# Patient Record
Sex: MALE | Race: BLACK OR AFRICAN AMERICAN
[De-identification: names, ages, dates, MRNs, and addresses within clinical notes are randomized per-mention and may not be internally consistent; named-entity substitution may affect disease eponyms.]

---

## 2018-04-17 ENCOUNTER — HOSPITAL ENCOUNTER (INPATIENT)
Dept: HOSPITAL 17 - NEPE | Age: 73
LOS: 6 days | Discharge: NURSING, REHAB OR LONG-TERM CARE | DRG: 482 | End: 2018-04-23
Attending: HOSPITALIST | Admitting: HOSPITALIST
Payer: MEDICARE

## 2018-04-17 VITALS
DIASTOLIC BLOOD PRESSURE: 58 MMHG | TEMPERATURE: 98.7 F | RESPIRATION RATE: 18 BRPM | HEART RATE: 90 BPM | SYSTOLIC BLOOD PRESSURE: 111 MMHG | OXYGEN SATURATION: 92 %

## 2018-04-17 VITALS
SYSTOLIC BLOOD PRESSURE: 100 MMHG | HEART RATE: 95 BPM | RESPIRATION RATE: 16 BRPM | TEMPERATURE: 99.3 F | OXYGEN SATURATION: 100 % | DIASTOLIC BLOOD PRESSURE: 67 MMHG

## 2018-04-17 VITALS
SYSTOLIC BLOOD PRESSURE: 116 MMHG | HEART RATE: 96 BPM | TEMPERATURE: 99.3 F | RESPIRATION RATE: 18 BRPM | OXYGEN SATURATION: 96 % | DIASTOLIC BLOOD PRESSURE: 71 MMHG

## 2018-04-17 VITALS
RESPIRATION RATE: 16 BRPM | TEMPERATURE: 99.5 F | OXYGEN SATURATION: 99 % | DIASTOLIC BLOOD PRESSURE: 63 MMHG | SYSTOLIC BLOOD PRESSURE: 109 MMHG | HEART RATE: 83 BPM

## 2018-04-17 VITALS
DIASTOLIC BLOOD PRESSURE: 68 MMHG | HEART RATE: 88 BPM | SYSTOLIC BLOOD PRESSURE: 112 MMHG | RESPIRATION RATE: 18 BRPM | OXYGEN SATURATION: 97 %

## 2018-04-17 VITALS
RESPIRATION RATE: 16 BRPM | DIASTOLIC BLOOD PRESSURE: 84 MMHG | HEART RATE: 80 BPM | OXYGEN SATURATION: 98 % | TEMPERATURE: 98 F | SYSTOLIC BLOOD PRESSURE: 145 MMHG

## 2018-04-17 VITALS
HEART RATE: 83 BPM | SYSTOLIC BLOOD PRESSURE: 127 MMHG | DIASTOLIC BLOOD PRESSURE: 83 MMHG | TEMPERATURE: 98.7 F | RESPIRATION RATE: 18 BRPM | OXYGEN SATURATION: 100 %

## 2018-04-17 VITALS — HEIGHT: 68 IN | WEIGHT: 120.15 LBS | BODY MASS INDEX: 18.21 KG/M2

## 2018-04-17 VITALS — OXYGEN SATURATION: 99 %

## 2018-04-17 DIAGNOSIS — F17.210: ICD-10-CM

## 2018-04-17 DIAGNOSIS — K59.03: ICD-10-CM

## 2018-04-17 DIAGNOSIS — N40.0: ICD-10-CM

## 2018-04-17 DIAGNOSIS — S72.142A: Primary | ICD-10-CM
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
## 2018-04-17 DIAGNOSIS — D64.89: ICD-10-CM

## 2018-04-17 DIAGNOSIS — F14.10: ICD-10-CM

## 2018-04-17 DIAGNOSIS — T40.605A: ICD-10-CM

## 2018-04-17 DIAGNOSIS — F10.10: ICD-10-CM

## 2018-04-17 LAB
ALBUMIN SERPL-MCNC: 3.8 GM/DL (ref 3.4–5)
ALP SERPL-CCNC: 73 U/L (ref 45–117)
ALT SERPL-CCNC: 27 U/L (ref 12–78)
AST SERPL-CCNC: 42 U/L (ref 15–37)
BASOPHILS # BLD AUTO: 0 TH/MM3 (ref 0–0.2)
BASOPHILS NFR BLD: 0.8 % (ref 0–2)
BILIRUB SERPL-MCNC: 1.1 MG/DL (ref 0.2–1)
BUN SERPL-MCNC: 11 MG/DL (ref 7–18)
CALCIUM SERPL-MCNC: 8.8 MG/DL (ref 8.5–10.1)
CHLORIDE SERPL-SCNC: 105 MEQ/L (ref 98–107)
COLOR UR: YELLOW
CREAT SERPL-MCNC: 1.21 MG/DL (ref 0.6–1.3)
EOSINOPHIL # BLD: 0.1 TH/MM3 (ref 0–0.4)
EOSINOPHIL NFR BLD: 2.7 % (ref 0–4)
ERYTHROCYTE [DISTWIDTH] IN BLOOD BY AUTOMATED COUNT: 14.7 % (ref 11.6–17.2)
GFR SERPLBLD BASED ON 1.73 SQ M-ARVRAT: 72 ML/MIN (ref 89–?)
GLUCOSE SERPL-MCNC: 89 MG/DL (ref 74–106)
GLUCOSE UR STRIP-MCNC: (no result) MG/DL
HCO3 BLD-SCNC: 26 MEQ/L (ref 21–32)
HCT VFR BLD CALC: 39.8 % (ref 39–51)
HGB BLD-MCNC: 13.8 GM/DL (ref 13–17)
HGB UR QL STRIP: (no result)
INR PPP: 1 RATIO
KETONES UR STRIP-MCNC: (no result) MG/DL
LYMPHOCYTES # BLD AUTO: 1.2 TH/MM3 (ref 1–4.8)
LYMPHOCYTES NFR BLD AUTO: 33.6 % (ref 9–44)
MCH RBC QN AUTO: 33.3 PG (ref 27–34)
MCHC RBC AUTO-ENTMCNC: 34.7 % (ref 32–36)
MCV RBC AUTO: 96 FL (ref 80–100)
MONOCYTE #: 0.2 TH/MM3 (ref 0–0.9)
MONOCYTES NFR BLD: 6.7 % (ref 0–8)
MUCOUS THREADS #/AREA URNS LPF: (no result) /LPF
NEUTROPHILS # BLD AUTO: 1.9 TH/MM3 (ref 1.8–7.7)
NEUTROPHILS NFR BLD AUTO: 56.2 % (ref 16–70)
NITRITE UR QL STRIP: (no result)
PLATELET # BLD: 252 TH/MM3 (ref 150–450)
PMV BLD AUTO: 8 FL (ref 7–11)
PROT SERPL-MCNC: 7.5 GM/DL (ref 6.4–8.2)
PROTHROMBIN TIME: 10.3 SEC (ref 9.8–11.6)
RBC # BLD AUTO: 4.15 MIL/MM3 (ref 4.5–5.9)
SODIUM SERPL-SCNC: 138 MEQ/L (ref 136–145)
SP GR UR STRIP: 1.01 (ref 1–1.03)
URINE LEUKOCYTE ESTERASE: (no result)
WBC # BLD AUTO: 3.4 TH/MM3 (ref 4–11)

## 2018-04-17 PROCEDURE — 86850 RBC ANTIBODY SCREEN: CPT

## 2018-04-17 PROCEDURE — 82306 VITAMIN D 25 HYDROXY: CPT

## 2018-04-17 PROCEDURE — 76000 FLUOROSCOPY <1 HR PHYS/QHP: CPT

## 2018-04-17 PROCEDURE — 85730 THROMBOPLASTIN TIME PARTIAL: CPT

## 2018-04-17 PROCEDURE — C1713 ANCHOR/SCREW BN/BN,TIS/BN: HCPCS

## 2018-04-17 PROCEDURE — 73552 X-RAY EXAM OF FEMUR 2/>: CPT

## 2018-04-17 PROCEDURE — 85018 HEMOGLOBIN: CPT

## 2018-04-17 PROCEDURE — 80053 COMPREHEN METABOLIC PANEL: CPT

## 2018-04-17 PROCEDURE — 84100 ASSAY OF PHOSPHORUS: CPT

## 2018-04-17 PROCEDURE — 86920 COMPATIBILITY TEST SPIN: CPT

## 2018-04-17 PROCEDURE — 85014 HEMATOCRIT: CPT

## 2018-04-17 PROCEDURE — 84443 ASSAY THYROID STIM HORMONE: CPT

## 2018-04-17 PROCEDURE — 82272 OCCULT BLD FECES 1-3 TESTS: CPT

## 2018-04-17 PROCEDURE — 81001 URINALYSIS AUTO W/SCOPE: CPT

## 2018-04-17 PROCEDURE — 80048 BASIC METABOLIC PNL TOTAL CA: CPT

## 2018-04-17 PROCEDURE — P9016 RBC LEUKOCYTES REDUCED: HCPCS

## 2018-04-17 PROCEDURE — 84439 ASSAY OF FREE THYROXINE: CPT

## 2018-04-17 PROCEDURE — 71045 X-RAY EXAM CHEST 1 VIEW: CPT

## 2018-04-17 PROCEDURE — 73502 X-RAY EXAM HIP UNI 2-3 VIEWS: CPT

## 2018-04-17 PROCEDURE — 85610 PROTHROMBIN TIME: CPT

## 2018-04-17 PROCEDURE — 83036 HEMOGLOBIN GLYCOSYLATED A1C: CPT

## 2018-04-17 PROCEDURE — 83735 ASSAY OF MAGNESIUM: CPT

## 2018-04-17 PROCEDURE — 36430 TRANSFUSION BLD/BLD COMPNT: CPT

## 2018-04-17 PROCEDURE — 96372 THER/PROPH/DIAG INJ SC/IM: CPT

## 2018-04-17 PROCEDURE — 0QS706Z REPOSITION LEFT UPPER FEMUR WITH INTRAMEDULLARY INTERNAL FIXATION DEVICE, OPEN APPROACH: ICD-10-PCS | Performed by: ORTHOPAEDIC SURGERY

## 2018-04-17 PROCEDURE — 85025 COMPLETE CBC W/AUTO DIFF WBC: CPT

## 2018-04-17 PROCEDURE — 96374 THER/PROPH/DIAG INJ IV PUSH: CPT

## 2018-04-17 PROCEDURE — 86900 BLOOD TYPING SEROLOGIC ABO: CPT

## 2018-04-17 PROCEDURE — 86901 BLOOD TYPING SEROLOGIC RH(D): CPT

## 2018-04-17 PROCEDURE — 93005 ELECTROCARDIOGRAM TRACING: CPT

## 2018-04-17 RX ADMIN — MORPHINE SULFATE PRN MG: 2 INJECTION, SOLUTION INTRAMUSCULAR; INTRAVENOUS at 10:57

## 2018-04-17 RX ADMIN — PHENYTOIN SODIUM SCH MLS/HR: 50 INJECTION INTRAMUSCULAR; INTRAVENOUS at 02:00

## 2018-04-17 RX ADMIN — STANDARDIZED SENNA CONCENTRATE AND DOCUSATE SODIUM SCH TAB: 8.6; 5 TABLET, FILM COATED ORAL at 20:21

## 2018-04-17 RX ADMIN — CALCIUM CARBONATE-CHOLECALCIFEROL TAB 250 MG-125 UNIT SCH MG: 250-125 TAB at 18:34

## 2018-04-17 RX ADMIN — MORPHINE SULFATE PRN MG: 2 INJECTION, SOLUTION INTRAMUSCULAR; INTRAVENOUS at 18:34

## 2018-04-17 RX ADMIN — STANDARDIZED SENNA CONCENTRATE AND DOCUSATE SODIUM SCH TAB: 8.6; 5 TABLET, FILM COATED ORAL at 07:09

## 2018-04-17 RX ADMIN — Medication SCH ML: at 20:21

## 2018-04-17 RX ADMIN — PHENYTOIN SODIUM SCH MLS/HR: 50 INJECTION INTRAMUSCULAR; INTRAVENOUS at 16:18

## 2018-04-17 RX ADMIN — MORPHINE SULFATE PRN MG: 2 INJECTION, SOLUTION INTRAMUSCULAR; INTRAVENOUS at 05:41

## 2018-04-17 RX ADMIN — FOLIC ACID SCH MG: 1 TABLET ORAL at 07:09

## 2018-04-17 RX ADMIN — Medication SCH ML: at 07:09

## 2018-04-17 RX ADMIN — Medication SCH MG: at 07:09

## 2018-04-17 RX ADMIN — MULTIPLE VITAMINS W/ MINERALS TAB SCH TAB: TAB at 07:09
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
## 2018-04-17 NOTE — PD.OP
cc:   Get Abad MD


__________________________________________________





Operative Report


Date of Surgery:  Apr 17, 2018


Preoperative Diagnosis:  


Displaced left hip intertrochanteric fracture


Postoperative Diagnosis:  


Procedure:


Left hip reduction and intramedullary fixation


Anesthesia:


General


Surgeon:


Get Abad


Assistant(s):


RIVAS Noe PA-C





 


The surgical procedure was assisted by my physician assistant. My P.A. presence 

was necessary throughout this case for the manipulation and positioning of the 

surgical extremity. My P.A. was assisting me throughout the duration of this 

procedure. The skill set of a physician assistant was medically necessary to 

complete this procedure. During the surgical case the surgical tech was working 

at the back table and the physician assistant was directly assisting me.


Operation and Findings:


Implants used: [12]mm x [420]mm   Synthes  troch nail





Plan of activity: Weight-bear as tolerated





Patient was seen and evaluated preoperatively.  The patient has significant hip 

pain from proximal femur fracture.  The risk and benefits of surgery were 

discussed in depth with the patient to include bleeding, infection, nonunion, 

malunion,  need for hip replacement, painful hardware, as well as medical 

competitions including blood clots, stroke, heart attack, and death.  Informed 

consent was obtained.  Operative site was marked.  Patient was brought to the 

operating room and placed on fracture table.  IV sedation was administered by 

anesthesiologist.  Timeout procedure was performed.  Hip and leg were prepped 

with alcohol followed by DuraPrep and draped in the usual sterile fashion.  IV 

antibiotics were given prior to incision.





Procedure began with reduction of fracture.  Traction was applied.  The leg was 

manipulated to achieve reduction.  Excellent reduction was achieved.  

Fluoroscopy was used to confirm reduction.  A three inch incision was made 

proximal to the trochanter.  Subcutaneous tissue was dissected bluntly.  

Guidepin was placed at the tip of the trochanter and advanced into the femoral 

canal.  Fluoroscopy confirmed appropriate guidepin placement.  A opening reamer 

was placed over the guidepin.  A long ball tipped guide pin was now placed down 

the femoral canal into the center of the distal femur.  The nail length was now 

measured.  Fluoroscopy confirmed appropriate guidepin placement.  Flexible 

reamers were now passed over the guidepin to ream the intramedullary canal.





The  nail was attached to the insertion handle.  Nail was now placed over the 

guidepin into the femoral canal.  Fluoroscopy confirmed appropriate nail 

placement.  A second incision was made over the lateral thigh.  Cannulas were 

placed through the insertion handle down to the femur.  Guidepin was now placed 

through the femoral nail into the center of the femoral head.  Fluoroscopy 

confirmed appropriate guidepin placement.  Screw length was measured.  

Cannulated drill was placed over the guidepin.  Appropriate length lag screw 

was now placed.  Traction was released and compression was applied. The set 

screw was now tightened in dynamic mode.





Next, using perfect [place] technique two distal interlocking screws were 

placed.  Screw holes were predrilled and screw lengths were measured.  Final 

fluoroscopy revealed well aligned fracture with well-placed hardware.  Incision 

was closed with 3-0 Vicryl and staples.  Sterile dressings were applied.  

Patient was awakened and transferred to recovery room.











Get Abad MD Apr 17, 2018 13:49
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
## 2018-04-17 NOTE — EKG
Date Performed: 04/17/2018       Time Performed: 01:16:48

 

PTAGE:      73 years

 

EKG:      Sinus rhythm 

 

 POSSIBLE RIGHT ATRIAL ENLARGEMENT LEFT ATRIAL ENLARGEMENT SEPTAL MYOCARDIAL INFARCTION ABNORMAL ECG 


 

NO PREVIOUS TRACING             Now consider anteroseptal myocardial infarction - age indeterminate.

 

DOCTOR:   Rishabh Cardozo  Interpretating Date/Time  04/17/2018 15:47:33
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
## 2018-04-18 VITALS — OXYGEN SATURATION: 97 %

## 2018-04-18 VITALS
DIASTOLIC BLOOD PRESSURE: 53 MMHG | OXYGEN SATURATION: 96 % | SYSTOLIC BLOOD PRESSURE: 94 MMHG | HEART RATE: 94 BPM | RESPIRATION RATE: 18 BRPM | TEMPERATURE: 99.6 F

## 2018-04-18 VITALS
OXYGEN SATURATION: 97 % | TEMPERATURE: 98.5 F | RESPIRATION RATE: 18 BRPM | SYSTOLIC BLOOD PRESSURE: 107 MMHG | DIASTOLIC BLOOD PRESSURE: 66 MMHG | HEART RATE: 86 BPM

## 2018-04-18 VITALS
RESPIRATION RATE: 18 BRPM | OXYGEN SATURATION: 99 % | HEART RATE: 80 BPM | SYSTOLIC BLOOD PRESSURE: 95 MMHG | TEMPERATURE: 99.1 F | DIASTOLIC BLOOD PRESSURE: 54 MMHG

## 2018-04-18 VITALS
SYSTOLIC BLOOD PRESSURE: 89 MMHG | RESPIRATION RATE: 18 BRPM | TEMPERATURE: 98 F | DIASTOLIC BLOOD PRESSURE: 54 MMHG | OXYGEN SATURATION: 93 % | HEART RATE: 78 BPM

## 2018-04-18 VITALS
HEART RATE: 80 BPM | OXYGEN SATURATION: 98 % | RESPIRATION RATE: 18 BRPM | SYSTOLIC BLOOD PRESSURE: 135 MMHG | DIASTOLIC BLOOD PRESSURE: 48 MMHG | TEMPERATURE: 95.7 F

## 2018-04-18 LAB
ALBUMIN SERPL-MCNC: 2.7 GM/DL (ref 3.4–5)
ALP SERPL-CCNC: 38 U/L (ref 45–117)
ALT SERPL-CCNC: 16 U/L (ref 12–78)
AST SERPL-CCNC: 16 U/L (ref 15–37)
BASOPHILS # BLD AUTO: 0 TH/MM3 (ref 0–0.2)
BASOPHILS NFR BLD: 0.4 % (ref 0–2)
BILIRUB SERPL-MCNC: 2.3 MG/DL (ref 0.2–1)
BUN SERPL-MCNC: 12 MG/DL (ref 7–18)
CALCIUM SERPL-MCNC: 8 MG/DL (ref 8.5–10.1)
CHLORIDE SERPL-SCNC: 102 MEQ/L (ref 98–107)
CREAT SERPL-MCNC: 1.13 MG/DL (ref 0.6–1.3)
EOSINOPHIL # BLD: 0 TH/MM3 (ref 0–0.4)
EOSINOPHIL NFR BLD: 0.1 % (ref 0–4)
ERYTHROCYTE [DISTWIDTH] IN BLOOD BY AUTOMATED COUNT: 14.2 % (ref 11.6–17.2)
GFR SERPLBLD BASED ON 1.73 SQ M-ARVRAT: 78 ML/MIN (ref 89–?)
GLUCOSE SERPL-MCNC: 114 MG/DL (ref 74–106)
HBA1C MFR BLD: 5.5 % (ref 4.3–6)
HCO3 BLD-SCNC: 26.6 MEQ/L (ref 21–32)
HCT VFR BLD CALC: 23.2 % (ref 39–51)
HGB BLD-MCNC: 8 GM/DL (ref 13–17)
LYMPHOCYTES # BLD AUTO: 1.2 TH/MM3 (ref 1–4.8)
LYMPHOCYTES NFR BLD AUTO: 19.1 % (ref 9–44)
MAGNESIUM SERPL-MCNC: 1.9 MG/DL (ref 1.5–2.5)
MCH RBC QN AUTO: 33.4 PG (ref 27–34)
MCHC RBC AUTO-ENTMCNC: 34.4 % (ref 32–36)
MCV RBC AUTO: 96.9 FL (ref 80–100)
MONOCYTE #: 0.7 TH/MM3 (ref 0–0.9)
MONOCYTES NFR BLD: 10.5 % (ref 0–8)
NEUTROPHILS # BLD AUTO: 4.3 TH/MM3 (ref 1.8–7.7)
NEUTROPHILS NFR BLD AUTO: 69.9 % (ref 16–70)
PHOSPHATE SERPL-MCNC: 3 MG/DL (ref 2.5–4.9)
PLATELET # BLD: 139 TH/MM3 (ref 150–450)
PMV BLD AUTO: 7.8 FL (ref 7–11)
PROT SERPL-MCNC: 5.3 GM/DL (ref 6.4–8.2)
RBC # BLD AUTO: 2.39 MIL/MM3 (ref 4.5–5.9)
SODIUM SERPL-SCNC: 135 MEQ/L (ref 136–145)
T4 FREE SERPL-MCNC: 1.15 NG/DL (ref 0.76–1.46)
WBC # BLD AUTO: 6.2 TH/MM3 (ref 4–11)

## 2018-04-18 RX ADMIN — PHENYTOIN SODIUM SCH MLS/HR: 50 INJECTION INTRAMUSCULAR; INTRAVENOUS at 20:54

## 2018-04-18 RX ADMIN — FOLIC ACID SCH MG: 1 TABLET ORAL at 08:54

## 2018-04-18 RX ADMIN — HYDROCODONE BITARTRATE AND ACETAMINOPHEN PRN TAB: 7.5; 325 TABLET ORAL at 19:05

## 2018-04-18 RX ADMIN — CHOLECALCIFEROL CAP 125 MCG (5000 UNIT) SCH UNITS: 125 CAP at 08:54

## 2018-04-18 RX ADMIN — STANDARDIZED SENNA CONCENTRATE AND DOCUSATE SODIUM SCH TAB: 8.6; 5 TABLET, FILM COATED ORAL at 08:54

## 2018-04-18 RX ADMIN — HYDROCODONE BITARTRATE AND ACETAMINOPHEN PRN TAB: 7.5; 325 TABLET ORAL at 01:53

## 2018-04-18 RX ADMIN — HYDROCODONE BITARTRATE AND ACETAMINOPHEN PRN TAB: 7.5; 325 TABLET ORAL at 05:05

## 2018-04-18 RX ADMIN — CALCIUM CARBONATE-CHOLECALCIFEROL TAB 250 MG-125 UNIT SCH MG: 250-125 TAB at 08:54

## 2018-04-18 RX ADMIN — ENOXAPARIN SODIUM SCH MG: 30 INJECTION SUBCUTANEOUS at 13:21

## 2018-04-18 RX ADMIN — MULTIPLE VITAMINS W/ MINERALS TAB SCH TAB: TAB at 08:54

## 2018-04-18 RX ADMIN — CALCIUM CARBONATE-CHOLECALCIFEROL TAB 250 MG-125 UNIT SCH MG: 250-125 TAB at 18:00

## 2018-04-18 RX ADMIN — Medication SCH MG: at 08:54

## 2018-04-18 RX ADMIN — MORPHINE SULFATE PRN MG: 2 INJECTION, SOLUTION INTRAMUSCULAR; INTRAVENOUS at 05:53

## 2018-04-18 RX ADMIN — STANDARDIZED SENNA CONCENTRATE AND DOCUSATE SODIUM SCH TAB: 8.6; 5 TABLET, FILM COATED ORAL at 19:38

## 2018-04-18 RX ADMIN — Medication SCH ML: at 21:00

## 2018-04-18 RX ADMIN — HYDROCODONE BITARTRATE AND ACETAMINOPHEN PRN TAB: 7.5; 325 TABLET ORAL at 08:59

## 2018-04-18 NOTE — PD.ORT.PN
Subjective


Subjective Remarks


POD 1 s/p IMN left hip


doing well. reports pain controlled.





Objective


Vitals





Vital Signs








  Date Time  Temp Pulse Resp B/P (MAP) Pulse Ox O2 Delivery O2 Flow Rate FiO2


 


4/18/18 04:02 99.6 94 18 94/53 (67) 96   


 


4/17/18 23:11 99.3 96 18 116/71 (86) 96   


 


4/17/18 20:06     99   21


 


4/17/18 19:25 98.7 90 18 111/58 (75) 92   


 


4/17/18 16:15      Nasal Cannula 2.00 


 


4/17/18 16:00 99.3 95 16 100/67 (78) 100   


 


4/17/18 15:45 98.4 86 16 106/64 (78) 95 Nasal Cannula 2 


 


4/17/18 15:30  87 16 99/58 (72) 95 Nasal Cannula 2 


 


4/17/18 15:15  88 15 98/60 (73) 94 Nasal Cannula 2 


 


4/17/18 15:00  89 15 102/61 (75) 100 Nasal Cannula 3 


 


4/17/18 14:45  98 15 100/65 (77) 99 Nasal Cannula 3 


 


4/17/18 14:30  100 15 102/69 (80) 98 Nasal Cannula 3 


 


4/17/18 14:15  102 15 109/70 (83) 98 Nasal Cannula 3 


 


4/17/18 14:05 98.3 100 17 116/74 (88) 100 Nasal Cannula 4 


 


4/17/18 08:00 99.5 83 16 109/63 (78) 99   














I/O      


 


 4/17/18 4/17/18 4/17/18 4/18/18 4/18/18 4/18/18





 07:00 15:00 23:00 07:00 15:00 23:00


 


Intake Total  1000 ml  480 ml  


 


Output Total  25 ml  550 ml  


 


Balance  975 ml  -70 ml  


 


      


 


Intake Oral    480 ml  


 


Other  1000 ml    


 


Output Urine Total    550 ml  


 


Estimated Blood Loss  25 ml    


 


# Voids   0   


 


# Bowel Movements    0  








Result Diagram:  


4/17/18 0042 4/17/18 0042





Imaging





Last 24 hours Impressions








Hip and Pelvis X-Ray 4/17/18 0031 Signed





Impressions: 





 Service Date/Time:  Tuesday, April 17, 2018 00:54 - CONCLUSION:  Comminuted 

left 





 intertrochanteric hip fracture with medial angulation deformity.     Rohan Bartholomew MD 


 


Femur X-Ray 4/17/18 0031 Signed





Impressions: 





 Service Date/Time:  Tuesday, April 17, 2018 00:57 - CONCLUSION:  Acute 





 intertrochanteric fracture.     Rohan Bartholomew MD 


 


Chest X-Ray 4/17/18 0031 Signed





Impressions: 





 Service Date/Time:  Tuesday, April 17, 2018 00:58 - CONCLUSION:  No acute 





 abnormality demonstrated.     Rohan Bartholomew MD 








Objective Remarks


LLE: dressings clean and dry. intact. nvi





Assessment & Plan


Assessment and Plan


1) Left intertrochanteric femur fracture s/p IMN - POD 1


   -WBAT


   -daily dressing changes POD 2 


   -CM for rehab vs home with [facility]


   -medical mgmt


   -f/u with Francisca or PA in 2 weeks











Luciano Love PA/First Assist PA Apr 18, 2018 06:54
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
## 2018-04-19 VITALS
OXYGEN SATURATION: 94 % | SYSTOLIC BLOOD PRESSURE: 97 MMHG | TEMPERATURE: 98.7 F | DIASTOLIC BLOOD PRESSURE: 53 MMHG | HEART RATE: 93 BPM | RESPIRATION RATE: 18 BRPM

## 2018-04-19 VITALS
TEMPERATURE: 99.8 F | OXYGEN SATURATION: 98 % | DIASTOLIC BLOOD PRESSURE: 58 MMHG | HEART RATE: 90 BPM | RESPIRATION RATE: 18 BRPM | SYSTOLIC BLOOD PRESSURE: 97 MMHG

## 2018-04-19 VITALS
HEART RATE: 91 BPM | SYSTOLIC BLOOD PRESSURE: 92 MMHG | TEMPERATURE: 101 F | RESPIRATION RATE: 16 BRPM | DIASTOLIC BLOOD PRESSURE: 54 MMHG | OXYGEN SATURATION: 96 %

## 2018-04-19 VITALS
OXYGEN SATURATION: 94 % | SYSTOLIC BLOOD PRESSURE: 108 MMHG | DIASTOLIC BLOOD PRESSURE: 75 MMHG | HEART RATE: 93 BPM | RESPIRATION RATE: 16 BRPM | TEMPERATURE: 98.3 F

## 2018-04-19 VITALS
HEART RATE: 87 BPM | SYSTOLIC BLOOD PRESSURE: 94 MMHG | OXYGEN SATURATION: 95 % | TEMPERATURE: 98.9 F | DIASTOLIC BLOOD PRESSURE: 51 MMHG | RESPIRATION RATE: 18 BRPM

## 2018-04-19 VITALS
HEART RATE: 89 BPM | DIASTOLIC BLOOD PRESSURE: 52 MMHG | OXYGEN SATURATION: 95 % | SYSTOLIC BLOOD PRESSURE: 106 MMHG | RESPIRATION RATE: 18 BRPM

## 2018-04-19 VITALS
OXYGEN SATURATION: 95 % | SYSTOLIC BLOOD PRESSURE: 95 MMHG | HEART RATE: 84 BPM | RESPIRATION RATE: 17 BRPM | DIASTOLIC BLOOD PRESSURE: 51 MMHG | TEMPERATURE: 98.6 F

## 2018-04-19 VITALS — DIASTOLIC BLOOD PRESSURE: 56 MMHG | SYSTOLIC BLOOD PRESSURE: 109 MMHG | HEART RATE: 91 BPM | TEMPERATURE: 99.6 F

## 2018-04-19 LAB
BUN SERPL-MCNC: 7 MG/DL (ref 7–18)
CALCIUM SERPL-MCNC: 8.5 MG/DL (ref 8.5–10.1)
CHLORIDE SERPL-SCNC: 99 MEQ/L (ref 98–107)
CREAT SERPL-MCNC: 0.93 MG/DL (ref 0.6–1.3)
GFR SERPLBLD BASED ON 1.73 SQ M-ARVRAT: 97 ML/MIN (ref 89–?)
GLUCOSE SERPL-MCNC: 108 MG/DL (ref 74–106)
HCO3 BLD-SCNC: 29.7 MEQ/L (ref 21–32)
SODIUM SERPL-SCNC: 135 MEQ/L (ref 136–145)

## 2018-04-19 RX ADMIN — STANDARDIZED SENNA CONCENTRATE AND DOCUSATE SODIUM SCH TAB: 8.6; 5 TABLET, FILM COATED ORAL at 08:46

## 2018-04-19 RX ADMIN — CHOLECALCIFEROL CAP 125 MCG (5000 UNIT) SCH UNITS: 125 CAP at 08:46

## 2018-04-19 RX ADMIN — PHENYTOIN SODIUM SCH MLS/HR: 50 INJECTION INTRAMUSCULAR; INTRAVENOUS at 09:52

## 2018-04-19 RX ADMIN — OXYCODONE HYDROCHLORIDE AND ACETAMINOPHEN PRN TAB: 7.5; 325 TABLET ORAL at 15:37

## 2018-04-19 RX ADMIN — CALCIUM CARBONATE-CHOLECALCIFEROL TAB 250 MG-125 UNIT SCH MG: 250-125 TAB at 11:39

## 2018-04-19 RX ADMIN — FOLIC ACID SCH MG: 1 TABLET ORAL at 08:46

## 2018-04-19 RX ADMIN — ENOXAPARIN SODIUM SCH MG: 30 INJECTION SUBCUTANEOUS at 11:40

## 2018-04-19 RX ADMIN — SENNOSIDES PRN MG: 8.6 TABLET, FILM COATED ORAL at 08:46

## 2018-04-19 RX ADMIN — CALCIUM CARBONATE-CHOLECALCIFEROL TAB 250 MG-125 UNIT SCH MG: 250-125 TAB at 15:37

## 2018-04-19 RX ADMIN — STANDARDIZED SENNA CONCENTRATE AND DOCUSATE SODIUM SCH TAB: 8.6; 5 TABLET, FILM COATED ORAL at 19:40

## 2018-04-19 RX ADMIN — OXYCODONE HYDROCHLORIDE AND ACETAMINOPHEN PRN TAB: 7.5; 325 TABLET ORAL at 22:40

## 2018-04-19 RX ADMIN — Medication SCH MG: at 08:46

## 2018-04-19 RX ADMIN — CALCIUM CARBONATE-CHOLECALCIFEROL TAB 250 MG-125 UNIT SCH MG: 250-125 TAB at 08:46

## 2018-04-19 RX ADMIN — Medication SCH ML: at 08:49

## 2018-04-19 RX ADMIN — HYDROCODONE BITARTRATE AND ACETAMINOPHEN PRN TAB: 7.5; 325 TABLET ORAL at 05:09

## 2018-04-19 RX ADMIN — WATER PRN ML: 1 IRRIGANT IRRIGATION at 08:47

## 2018-04-19 RX ADMIN — MULTIPLE VITAMINS W/ MINERALS TAB SCH TAB: TAB at 08:45

## 2018-04-19 RX ADMIN — Medication SCH ML: at 19:40

## 2018-04-19 RX ADMIN — HYDROCODONE BITARTRATE AND ACETAMINOPHEN PRN TAB: 7.5; 325 TABLET ORAL at 08:45

## 2018-04-19 RX ADMIN — OXYCODONE HYDROCHLORIDE AND ACETAMINOPHEN PRN TAB: 7.5; 325 TABLET ORAL at 11:39

## 2018-04-19 RX ADMIN — TAMSULOSIN HYDROCHLORIDE SCH MG: 0.4 CAPSULE ORAL at 08:46

## 2018-04-19 NOTE — PD.ORT.PN
Subjective


Subjective Remarks


POD 2 s/p IMN left hip


doing well. reports pain controlled.





Objective


Vitals





Vital Signs








  Date Time  Temp Pulse Resp B/P (MAP) Pulse Ox O2 Delivery O2 Flow Rate FiO2


 


4/19/18 05:06 99.6 91  109/56 (73)    


 


4/19/18 04:00 101.0 91 16 92/54 (67) 96   


 


4/19/18 00:00 99.8 90 18 97/58 (71) 98   


 


4/18/18 20:00 95.7 80 18 135/48 (77) 98   


 


4/18/18 19:40      Room Air  


 


4/18/18 17:55     97   21


 


4/18/18 16:00 98.5 86 18 107/66 (80) 97   


 


4/18/18 13:52        21


 


4/18/18 12:00 98.0 78 18 89/54 (66) 93   


 


4/18/18 08:00 99.1 80 18 95/54 (68) 99   














I/O      


 


 4/18/18 4/18/18 4/18/18 4/19/18 4/19/18 4/19/18





 07:00 15:00 23:00 07:00 15:00 23:00


 


Intake Total 480 ml 600 ml  480 ml  


 


Output Total 550 ml 2100 ml  750 ml  


 


Balance -70 ml -1500 ml  -270 ml  


 


      


 


Intake Oral 480 ml 600 ml  480 ml  


 


Output Urine Total 550 ml 2100 ml  750 ml  


 


# Voids    2  


 


# Bowel Movements 0 0    








Result Diagram:  


4/18/18 0712                                                                   

             4/18/18 0712





Imaging





Last 24 hours Impressions








Hip and Pelvis X-Ray 4/17/18 0031 Signed





Impressions: 





 Service Date/Time:  Tuesday, April 17, 2018 00:54 - CONCLUSION:  Comminuted 

left 





 intertrochanteric hip fracture with medial angulation deformity.     Rohan Bartholomew MD 


 


Femur X-Ray 4/17/18 0031 Signed





Impressions: 





 Service Date/Time:  Tuesday, April 17, 2018 00:57 - CONCLUSION:  Acute 





 intertrochanteric fracture.     Rohan Bartholomew MD 


 


Chest X-Ray 4/17/18 0031 Signed





Impressions: 





 Service Date/Time:  Tuesday, April 17, 2018 00:58 - CONCLUSION:  No acute 





 abnormality demonstrated.     Rohan Bartholomew MD 








Objective Remarks


LLE: dressings clean and dry. intact. nvi





Assessment & Plan


Assessment and Plan


1) Left intertrochanteric femur fracture s/p IMN - POD 2


   -WBAT


   -daily dressing changes POD 2 


   -CM for rehab vs home with C


   -medical mgmt


   -f/u with Francisca or PA in 2 weeks











Luciano Love PA/First Assist PA Apr 19, 2018 06:40

## 2018-04-19 NOTE — HHI.PR
Subjective


Remarks


Follow up left hip fracture. POD #2. Patient complaining of pain 8/10 states 

the pain medication is not working well. Denies any chest pain or SOB. States 

he is working with PT well.





Objective


Vitals





Vital Signs








  Date Time  Temp Pulse Resp B/P (MAP) Pulse Ox O2 Delivery O2 Flow Rate FiO2


 


4/19/18 09:10      Room Air  


 


4/19/18 08:00 98.6 84 17 95/51 (66) 95   


 


4/19/18 05:06 99.6 91  109/56 (73)    


 


4/19/18 04:00 101.0 91 16 92/54 (67) 96   


 


4/19/18 00:00 99.8 90 18 97/58 (71) 98   


 


4/18/18 20:00 95.7 80 18 135/48 (77) 98   


 


4/18/18 19:40      Room Air  


 


4/18/18 17:55     97   21


 


4/18/18 16:00 98.5 86 18 107/66 (80) 97   


 


4/18/18 13:52        21


 


4/18/18 12:00 98.0 78 18 89/54 (66) 93   














I/O      


 


 4/18/18 4/18/18 4/18/18 4/19/18 4/19/18 4/19/18





 07:00 15:00 23:00 07:00 15:00 23:00


 


Intake Total 480 ml 600 ml  480 ml  


 


Output Total 550 ml 2100 ml  750 ml  


 


Balance -70 ml -1500 ml  -270 ml  


 


      


 


Intake Oral 480 ml 600 ml  480 ml  


 


Output Urine Total 550 ml 2100 ml  750 ml  


 


# Voids    2  


 


# Bowel Movements 0 0    








Result Diagram:  


4/18/18 0712 4/18/18 0712





Imaging





Last Impressions








Hip and Pelvis X-Ray 4/17/18 0031 Signed





Impressions: 





 Service Date/Time:  Tuesday, April 17, 2018 00:54 - CONCLUSION:  Comminuted 

left 





 intertrochanteric hip fracture with medial angulation deformity.     Rohan Bartholomew MD 


 


Femur X-Ray 4/17/18 0031 Signed





Impressions: 





 Service Date/Time:  Tuesday, April 17, 2018 00:57 - CONCLUSION:  Acute 





 intertrochanteric fracture.     Rohan Bartholomew MD 


 


Chest X-Ray 4/17/18 0031 Signed





Impressions: 





 Service Date/Time:  Tuesday, April 17, 2018 00:58 - CONCLUSION:  No acute 





 abnormality demonstrated.     Rohan Bartholomew MD 








Objective Remarks


GENERAL: Patient in NAD, complaining of some pain


SKIN: Warm and dry. Dressings dry and intact


HEAD: Atraumatic. Normocephalic. 


EYES: Pupils equal and round. No scleral icterus. No injection or drainage. 


ENT: No nasal bleeding or discharge.  Mucous membranes pink and moist.


NECK: Trachea midline. No JVD. 


CARDIOVASCULAR: Regular rate and rhythm.  


RESPIRATORY: No accessory muscle use. Clear to auscultation. Breath sounds 

equal bilaterally. 


GASTROINTESTINAL: Abdomen soft, non-tender, nondistended. Hepatic and splenic 

margins not palpable. 


MUSCULOSKELETAL: Extremities without clubbing, cyanosis, or edema. No obvious 

deformities. 


NEUROLOGICAL: Awake and alert. No obvious cranial nerve deficits.  Motor 

grossly within normal limits. Normal speech.


PSYCHIATRIC: Appropriate mood and affect; insight and judgment normal.


Procedures


Date of Surgery:  Apr 17, 2018


Preoperative Diagnosis:  


Displaced left hip intertrochanteric fracture


Postoperative Diagnosis:  


Procedure:


Left hip reduction and intramedullary fixation


Anesthesia:


General


Surgeon:


Get Espinosa


Assistant(s):


RIVAS Noe PA-C





 


The surgical procedure was assisted by my physician assistant. My P.A. presence 

was necessary throughout this case for the manipulation and positioning of the 

surgical extremity. My P.A. was assisting me throughout the duration of this 

procedure. The skill set of a physician assistant was medically necessary to 

complete this procedure. During the surgical case the surgical tech was working 

at the back table and the physician assistant was directly assisting me.


Operation and Findings:


Implants used: [12]mm x [420]mm   Synthes  troch nail





Plan of activity: Weight-bear as tolerated





Patient was seen and evaluated preoperatively.  The patient has significant hip 

pain from proximal femur fracture.  The risk and benefits of surgery were 

discussed in depth with the patient to include bleeding, infection, nonunion, 

malunion,  need for hip replacement, painful hardware, as well as medical 

competitions including blood clots, stroke, heart attack, and death.  Informed 

consent was obtained.  Operative site was marked.  Patient was brought to the 

operating room and placed on fracture table.  IV sedation was administered by 

anesthesiologist.  Timeout procedure was performed.  Hip and leg were prepped 

with alcohol followed by DuraPrep and draped in the usual sterile fashion.  IV 

antibiotics were given prior to incision.





Procedure began with reduction of fracture.  Traction was applied.  The leg was 

manipulated to achieve reduction.  Excellent reduction was achieved.  

Fluoroscopy was used to confirm reduction.  A three inch incision was made 

proximal to the trochanter.  Subcutaneous tissue was dissected bluntly.  

Guidepin was placed at the tip of the trochanter and advanced into the femoral 

canal.  Fluoroscopy confirmed appropriate guidepin placement.  A opening reamer 

was placed over the guidepin.  A long ball tipped guide pin was now placed down 

the femoral canal into the center of the distal femur.  The nail length was now 

measured.  Fluoroscopy confirmed appropriate guidepin placement.  Flexible 

reamers were now passed over the guidepin to ream the intramedullary canal.





The  nail was attached to the insertion handle.  Nail was now placed over the 

guidepin into the femoral canal.  Fluoroscopy confirmed appropriate nail 

placement.  A second incision was made over the lateral thigh.  Cannulas were 

placed through the insertion handle down to the femur.  Guidepin was now placed 

through the femoral nail into the center of the femoral head.  Fluoroscopy 

confirmed appropriate guidepin placement.  Screw length was measured.  

Cannulated drill was placed over the guidepin.  Appropriate length lag screw 

was now placed.  Traction was released and compression was applied. The set 

screw was now tightened in dynamic mode.





Next, using perfect [place] technique two distal interlocking screws were 

placed.  Screw holes were predrilled and screw lengths were measured.  Final 

fluoroscopy revealed well aligned fracture with well-placed hardware.  Incision 

was closed with 3-0 Vicryl and staples.  Sterile dressings were applied.  

Patient was awakened and transferred to recovery room.











Get Espinosa MD Apr 17, 2018 13:49


Urinary Catheter:  Yes


Assessment to:  Remove


Vascular Central Line Catheter:  No





A/P


Problem List:  


(1) Closed intertrochanteric fracture of left hip


ICD Code:  S72.142A - Displaced intertrochanteric fracture of left femur, 

initial encounter for closed fracture


Status:  Acute


Assessment and Plan


1.  Left hip fracture


Pelvic x-ray significant for comminuted left intertrochanteric hip fracture 

with medial angulation deformity


   -Change PO medication to Percocet 7.5mg @4Hr, Morphine for breakthrough pain


   -Orthopedic surgery consulted, surgery was completed on 4/17


   -PT





Alcohol abuse


   -Thiamine/folate/multivitamins


   -[facility] protocol


   -Monitor for signs of withdrawal





Cocaine abuse, Chronic


   -Cessation counseling provided





Nasal/throat irritation, Resolved


   -Does not appear to be infectious in origin


   -Cont to monitor





Anemia, hgb 8.0


   -Awaiting am labs





BPH


   -Flomax started 4/18


   -Remove Monroy, straight cath as needed, if patient continues to retain will 

reinsert Monroy





DVT prophylaxis: Lovenox


Discharge Planning


Discharge to SNF when set up by case management





Ortho suggests WBAT, Dressing changes, follow up [facility] in 2 weeks





Problem Qualifiers





(1) Closed intertrochanteric fracture of left hip:  


Qualified Codes:  S72.142A - Displaced intertrochanteric fracture of left femur

, initial encounter for closed fracture








Stephanie Linn Apr 19, 2018 09:50

## 2018-04-20 VITALS
TEMPERATURE: 99.6 F | SYSTOLIC BLOOD PRESSURE: 102 MMHG | OXYGEN SATURATION: 91 % | DIASTOLIC BLOOD PRESSURE: 54 MMHG | RESPIRATION RATE: 17 BRPM | HEART RATE: 89 BPM

## 2018-04-20 VITALS
RESPIRATION RATE: 16 BRPM | TEMPERATURE: 98.5 F | OXYGEN SATURATION: 92 % | DIASTOLIC BLOOD PRESSURE: 53 MMHG | SYSTOLIC BLOOD PRESSURE: 91 MMHG | HEART RATE: 83 BPM

## 2018-04-20 VITALS
OXYGEN SATURATION: 96 % | TEMPERATURE: 98.6 F | HEART RATE: 84 BPM | RESPIRATION RATE: 18 BRPM | DIASTOLIC BLOOD PRESSURE: 59 MMHG | SYSTOLIC BLOOD PRESSURE: 101 MMHG

## 2018-04-20 VITALS
DIASTOLIC BLOOD PRESSURE: 59 MMHG | HEART RATE: 84 BPM | SYSTOLIC BLOOD PRESSURE: 101 MMHG | OXYGEN SATURATION: 96 % | TEMPERATURE: 98.6 F | RESPIRATION RATE: 18 BRPM

## 2018-04-20 VITALS
TEMPERATURE: 99.5 F | OXYGEN SATURATION: 96 % | RESPIRATION RATE: 18 BRPM | SYSTOLIC BLOOD PRESSURE: 100 MMHG | HEART RATE: 79 BPM | DIASTOLIC BLOOD PRESSURE: 59 MMHG

## 2018-04-20 VITALS
SYSTOLIC BLOOD PRESSURE: 101 MMHG | RESPIRATION RATE: 17 BRPM | HEART RATE: 85 BPM | TEMPERATURE: 99.3 F | DIASTOLIC BLOOD PRESSURE: 55 MMHG | OXYGEN SATURATION: 94 %

## 2018-04-20 VITALS
TEMPERATURE: 98.6 F | SYSTOLIC BLOOD PRESSURE: 87 MMHG | RESPIRATION RATE: 16 BRPM | OXYGEN SATURATION: 95 % | DIASTOLIC BLOOD PRESSURE: 52 MMHG | HEART RATE: 88 BPM

## 2018-04-20 VITALS
RESPIRATION RATE: 16 BRPM | OXYGEN SATURATION: 95 % | HEART RATE: 88 BPM | DIASTOLIC BLOOD PRESSURE: 52 MMHG | TEMPERATURE: 98.6 F | SYSTOLIC BLOOD PRESSURE: 87 MMHG

## 2018-04-20 VITALS
OXYGEN SATURATION: 97 % | DIASTOLIC BLOOD PRESSURE: 59 MMHG | TEMPERATURE: 98.9 F | SYSTOLIC BLOOD PRESSURE: 110 MMHG | HEART RATE: 94 BPM | RESPIRATION RATE: 17 BRPM

## 2018-04-20 VITALS
RESPIRATION RATE: 16 BRPM | OXYGEN SATURATION: 97 % | HEART RATE: 78 BPM | SYSTOLIC BLOOD PRESSURE: 106 MMHG | TEMPERATURE: 99.3 F | DIASTOLIC BLOOD PRESSURE: 59 MMHG

## 2018-04-20 VITALS
SYSTOLIC BLOOD PRESSURE: 103 MMHG | DIASTOLIC BLOOD PRESSURE: 58 MMHG | OXYGEN SATURATION: 97 % | HEART RATE: 88 BPM | TEMPERATURE: 98.8 F | RESPIRATION RATE: 17 BRPM

## 2018-04-20 LAB
BASOPHILS # BLD AUTO: 0 TH/MM3 (ref 0–0.2)
BASOPHILS NFR BLD: 0.4 % (ref 0–2)
EOSINOPHIL # BLD: 0.1 TH/MM3 (ref 0–0.4)
EOSINOPHIL NFR BLD: 2.6 % (ref 0–4)
ERYTHROCYTE [DISTWIDTH] IN BLOOD BY AUTOMATED COUNT: 13.7 % (ref 11.6–17.2)
HCT VFR BLD CALC: 18.4 % (ref 39–51)
HCT VFR BLD CALC: 19.2 % (ref 39–51)
HGB BLD-MCNC: 6.3 GM/DL (ref 13–17)
HGB BLD-MCNC: 6.6 GM/DL (ref 13–17)
LYMPHOCYTES # BLD AUTO: 1.1 TH/MM3 (ref 1–4.8)
LYMPHOCYTES NFR BLD AUTO: 19.5 % (ref 9–44)
MCH RBC QN AUTO: 33.2 PG (ref 27–34)
MCHC RBC AUTO-ENTMCNC: 34.4 % (ref 32–36)
MCV RBC AUTO: 96.3 FL (ref 80–100)
MONOCYTE #: 0.4 TH/MM3 (ref 0–0.9)
MONOCYTES NFR BLD: 7.3 % (ref 0–8)
NEUTROPHILS # BLD AUTO: 3.9 TH/MM3 (ref 1.8–7.7)
NEUTROPHILS NFR BLD AUTO: 70.2 % (ref 16–70)
PLATELET # BLD: 139 TH/MM3 (ref 150–450)
PMV BLD AUTO: 7.8 FL (ref 7–11)
RBC # BLD AUTO: 1.91 MIL/MM3 (ref 4.5–5.9)
WBC # BLD AUTO: 5.6 TH/MM3 (ref 4–11)

## 2018-04-20 PROCEDURE — 30233N1 TRANSFUSION OF NONAUTOLOGOUS RED BLOOD CELLS INTO PERIPHERAL VEIN, PERCUTANEOUS APPROACH: ICD-10-PCS | Performed by: HOSPITALIST

## 2018-04-20 RX ADMIN — TAMSULOSIN HYDROCHLORIDE SCH MG: 0.4 CAPSULE ORAL at 08:07

## 2018-04-20 RX ADMIN — SENNOSIDES PRN MG: 8.6 TABLET, FILM COATED ORAL at 08:07

## 2018-04-20 RX ADMIN — CHOLECALCIFEROL CAP 125 MCG (5000 UNIT) SCH UNITS: 125 CAP at 08:07

## 2018-04-20 RX ADMIN — OXYCODONE HYDROCHLORIDE AND ACETAMINOPHEN PRN TAB: 7.5; 325 TABLET ORAL at 08:07

## 2018-04-20 RX ADMIN — STANDARDIZED SENNA CONCENTRATE AND DOCUSATE SODIUM SCH TAB: 8.6; 5 TABLET, FILM COATED ORAL at 08:07

## 2018-04-20 RX ADMIN — PHENYTOIN SODIUM SCH MLS/HR: 50 INJECTION INTRAMUSCULAR; INTRAVENOUS at 01:30

## 2018-04-20 RX ADMIN — OXYCODONE HYDROCHLORIDE AND ACETAMINOPHEN PRN TAB: 7.5; 325 TABLET ORAL at 12:49

## 2018-04-20 RX ADMIN — OXYCODONE HYDROCHLORIDE AND ACETAMINOPHEN PRN TAB: 7.5; 325 TABLET ORAL at 16:54

## 2018-04-20 RX ADMIN — OXYCODONE HYDROCHLORIDE AND ACETAMINOPHEN PRN TAB: 7.5; 325 TABLET ORAL at 03:15

## 2018-04-20 RX ADMIN — MAGNESIUM HYDROXIDE PRN ML: 400 SUSPENSION ORAL at 08:08

## 2018-04-20 RX ADMIN — CALCIUM CARBONATE-CHOLECALCIFEROL TAB 250 MG-125 UNIT SCH MG: 250-125 TAB at 08:07

## 2018-04-20 RX ADMIN — PHENYTOIN SODIUM SCH MLS/HR: 50 INJECTION INTRAMUSCULAR; INTRAVENOUS at 09:24

## 2018-04-20 RX ADMIN — ENOXAPARIN SODIUM SCH MG: 30 INJECTION SUBCUTANEOUS at 12:50

## 2018-04-20 RX ADMIN — Medication SCH ML: at 08:08

## 2018-04-20 RX ADMIN — FOLIC ACID SCH MG: 1 TABLET ORAL at 08:07

## 2018-04-20 RX ADMIN — CALCIUM CARBONATE-CHOLECALCIFEROL TAB 250 MG-125 UNIT SCH MG: 250-125 TAB at 12:49

## 2018-04-20 RX ADMIN — Medication SCH ML: at 20:57

## 2018-04-20 RX ADMIN — STANDARDIZED SENNA CONCENTRATE AND DOCUSATE SODIUM SCH TAB: 8.6; 5 TABLET, FILM COATED ORAL at 20:57

## 2018-04-20 RX ADMIN — OXYCODONE HYDROCHLORIDE AND ACETAMINOPHEN PRN TAB: 7.5; 325 TABLET ORAL at 21:06

## 2018-04-20 RX ADMIN — WATER PRN ML: 1 IRRIGANT IRRIGATION at 08:08

## 2018-04-20 RX ADMIN — Medication SCH MG: at 08:07

## 2018-04-20 RX ADMIN — MULTIPLE VITAMINS W/ MINERALS TAB SCH TAB: TAB at 08:07

## 2018-04-20 RX ADMIN — CALCIUM CARBONATE-CHOLECALCIFEROL TAB 250 MG-125 UNIT SCH MG: 250-125 TAB at 16:53

## 2018-04-20 NOTE — PD.ORT.PN
Subjective


Subjective Remarks


Comfortably with no new complaints





Objective


Vitals





Vital Signs








  Date Time  Temp Pulse Resp B/P (MAP) Pulse Ox O2 Delivery O2 Flow Rate FiO2


 


4/20/18 00:00 99.3 85 17 101/55 (70) 94   


 


4/19/18 22:39  89 18 106/52 (70) 95   


 


4/19/18 22:07        21


 


4/19/18 20:00 98.7 93 18 97/53 (68) 94   


 


4/19/18 15:14 98.3 93 16 108/75 (86) 94   


 


4/19/18 11:35 98.9 87 18 94/51 (65) 95   


 


4/19/18 09:10      Room Air  


 


4/19/18 08:00 98.6 84 17 95/51 (66) 95   














I/O      


 


 4/19/18 4/19/18 4/19/18 4/20/18 4/20/18 4/20/18





 07:00 15:00 23:00 07:00 15:00 23:00


 


Intake Total 480 ml  480 ml   


 


Output Total 750 ml  800 ml   


 


Balance -270 ml  -320 ml   


 


      


 


Intake Oral 480 ml  480 ml   


 


Output Urine Total 750 ml  800 ml   


 


Bladder Scan Volume Amount   34 ml   


 


# Voids 2     


 


# Bowel Movements   0   








Result Diagram:  


4/20/18 0500                                                                   

             4/19/18 0945





Imaging





Last 24 hours Impressions








Hip and Pelvis X-Ray 4/17/18 0031 Signed





Impressions: 





 Service Date/Time:  Tuesday, April 17, 2018 00:54 - CONCLUSION:  Comminuted 

left 





 intertrochanteric hip fracture with medial angulation deformity.     Rohan Bartholomew MD 


 


Femur X-Ray 4/17/18 0031 Signed





Impressions: 





 Service Date/Time:  Tuesday, April 17, 2018 00:57 - CONCLUSION:  Acute 





 intertrochanteric fracture.     Rohan Bartholomew MD 


 


Chest X-Ray 4/17/18 0031 Signed





Impressions: 





 Service Date/Time:  Tuesday, April 17, 2018 00:58 - CONCLUSION:  No acute 





 abnormality demonstrated.     Rohan Bartholomew MD 








Objective Remarks


LLE: dressings clean and dry. intact. nvi





Assessment & Plan


Assessment and Plan


1) Left intertrochanteric femur fracture s/p IMN - POD 3


   -WBAT


   -daily dressing changes 


   -CM for rehab vs home with [facility]


   -medical mgmt


   Orthopedically cleared for discharge


   -f/u with [facility] or PA in 2 weeks











Carlos Salinas Jr. Apr 20, 2018 06:50

## 2018-04-20 NOTE — HHI.PR
Subjective


Remarks


Follow up left hip fracture. POD #3. Patient denies pain. He states he is 

congested and a little short of breath. Denies any active bleeding, no blood in 

stool or urine.





Objective


Vitals





Vital Signs








  Date Time  Temp Pulse Resp B/P (MAP) Pulse Ox O2 Delivery O2 Flow Rate FiO2


 


4/20/18 08:24 98.9 94 17 110/59 (76) 97   


 


4/20/18 00:00 99.3 85 17 101/55 (70) 94   


 


4/19/18 22:39  89 18 106/52 (70) 95   


 


4/19/18 22:07        21


 


4/19/18 20:00 98.7 93 18 97/53 (68) 94   


 


4/19/18 15:14 98.3 93 16 108/75 (86) 94   


 


4/19/18 11:35 98.9 87 18 94/51 (65) 95   














I/O      


 


 4/19/18 4/19/18 4/19/18 4/20/18 4/20/18 4/20/18





 07:00 15:00 23:00 07:00 15:00 23:00


 


Intake Total 480 ml  480 ml 480 ml  


 


Output Total 750 ml  800 ml 1550 ml 450 ml 


 


Balance -270 ml  -320 ml -1070 ml -450 ml 


 


      


 


Intake Oral 480 ml  480 ml 480 ml  


 


Output Urine Total 750 ml  800 ml 1550 ml 450 ml 


 


Bladder Scan Volume Amount   34 ml   


 


# Voids 2     


 


# Bowel Movements   0 0  








Result Diagram:  


4/20/18 0840                                                                   

             4/19/18 0945





Objective Remarks


GENERAL: Patient in NAD, complaining of some pain


SKIN: Warm and dry. Dressings dry and intact


HEAD: Atraumatic. Normocephalic. 


EYES: Pupils equal and round. No scleral icterus. No injection or drainage. 


ENT: No nasal bleeding or discharge.  Mucous membranes pink and moist.


NECK: Trachea midline. No JVD. 


CARDIOVASCULAR: Regular rate and rhythm.  


RESPIRATORY: No accessory muscle use. Clear to auscultation. Breath sounds 

equal bilaterally. 


GASTROINTESTINAL: Abdomen soft, non-tender, nondistended. Hepatic and splenic 

margins not palpable. 


MUSCULOSKELETAL: Extremities without clubbing, cyanosis, or edema. No obvious 

deformities. 


NEUROLOGICAL: Awake and alert. No obvious cranial nerve deficits.  Motor 

grossly within normal limits. Normal speech.


PSYCHIATRIC: Appropriate mood and affect; insight and judgment normal.


Procedures


Date of Surgery:  Apr 17, 2018


Preoperative Diagnosis:  


Displaced left hip intertrochanteric fracture


Postoperative Diagnosis:  


Procedure:


Left hip reduction and intramedullary fixation


Anesthesia:


General


Surgeon:


Get Espinosa


Assistant(s):


RIVAS Noe PA-C





 


The surgical procedure was assisted by my physician assistant. My P.A. presence 

was necessary throughout this case for the manipulation and positioning of the 

surgical extremity. My P.A. was assisting me throughout the duration of this 

procedure. The skill set of a physician assistant was medically necessary to 

complete this procedure. During the surgical case the surgical tech was working 

at the back table and the physician assistant was directly assisting me.


Operation and Findings:


Implants used: [12]mm x [420]mm   Synthes  troch nail





Plan of activity: Weight-bear as tolerated





Patient was seen and evaluated preoperatively.  The patient has significant hip 

pain from proximal femur fracture.  The risk and benefits of surgery were 

discussed in depth with the patient to include bleeding, infection, nonunion, 

malunion,  need for hip replacement, painful hardware, as well as medical 

competitions including blood clots, stroke, heart attack, and death.  Informed 

consent was obtained.  Operative site was marked.  Patient was brought to the 

operating room and placed on fracture table.  IV sedation was administered by 

anesthesiologist.  Timeout procedure was performed.  Hip and leg were prepped 

with alcohol followed by DuraPrep and draped in the usual sterile fashion.  IV 

antibiotics were given prior to incision.





Procedure began with reduction of fracture.  Traction was applied.  The leg was 

manipulated to achieve reduction.  Excellent reduction was achieved.  

Fluoroscopy was used to confirm reduction.  A three inch incision was made 

proximal to the trochanter.  Subcutaneous tissue was dissected bluntly.  

Guidepin was placed at the tip of the trochanter and advanced into the femoral 

canal.  Fluoroscopy confirmed appropriate guidepin placement.  A opening reamer 

was placed over the guidepin.  A long ball tipped guide pin was now placed down 

the femoral canal into the center of the distal femur.  The nail length was now 

measured.  Fluoroscopy confirmed appropriate guidepin placement.  Flexible 

reamers were now passed over the guidepin to ream the intramedullary canal.





The  nail was attached to the insertion handle.  Nail was now placed over the 

guidepin into the femoral canal.  Fluoroscopy confirmed appropriate nail 

placement.  A second incision was made over the lateral thigh.  Cannulas were 

placed through the insertion handle down to the femur.  Guidepin was now placed 

through the femoral nail into the center of the femoral head.  Fluoroscopy 

confirmed appropriate guidepin placement.  Screw length was measured.  

Cannulated drill was placed over the guidepin.  Appropriate length lag screw 

was now placed.  Traction was released and compression was applied. The set 

screw was now tightened in dynamic mode.





Next, using perfect [place] technique two distal interlocking screws were 

placed.  Screw holes were predrilled and screw lengths were measured.  Final 

fluoroscopy revealed well aligned fracture with well-placed hardware.  Incision 

was closed with 3-0 Vicryl and staples.  Sterile dressings were applied.  

Patient was awakened and transferred to recovery room.











Get Espinosa MD Apr 17, 2018 13:49


Medications and IVs





Current Medications








 Medications


  (Trade)  Dose


 Ordered  Sig/Valentine


 Route  Start Time


 Stop Time Status Last Admin


 


  (Morphine Inj)  4 mg  Q3H  PRN


 IV PUSH  4/17/18 02:00


    4/18/18 05:53


 


 


  (Folate)  1 mg  DAILY


 PO  4/17/18 09:00


 4/22/18 08:59  4/20/18 08:07


 


 


  (Vitamin B1)  100 mg  DAILY


 PO  4/17/18 09:00


    4/20/18 08:07


 


 


  (Theragran M Tab)  1 tab  DAILY


 PO  4/17/18 09:00


 4/22/18 08:59  4/20/18 08:07


 


 


  (Romazicon Inj)  0.2 mg  Q1M  PRN


 IV PUSH  4/17/18 02:00


     


 


 


  (Ativan)  1 mg  Q4H  PRN


 PO  4/17/18 02:00


     


 


 


  (Ativan Inj)  1 mg  Q4H  PRN


 IV PUSH  4/17/18 02:00


     


 


 


  (Ativan)  2 mg  Q2H  PRN


 PO  4/17/18 02:00


     


 


 


  (Ativan Inj)  2 mg  Q2H  PRN


 IV PUSH  4/17/18 02:00


     


 


 


  (Ativan Inj)  2 mg  Q1H  PRN


 IV PUSH  4/17/18 02:00


     


 


 


  (Ativan Inj)  2 mg  Q15M  PRN


 IV PUSH  4/17/18 02:00


     


 


 


 Sodium Chloride  1,000 ml @ 


 70 mls/hr  [id number]


 IV  4/17/18 02:00


     


 


 


  (NS Flush)  2 ml  UNSCH  PRN


 IV FLUSH  4/17/18 02:00


     


 


 


  (NS Flush)  2 ml  BID


 IV FLUSH  4/17/18 09:00


    4/20/18 08:08


 


 


  (Tylenol)  650 mg  Q4H  PRN


 PO  4/17/18 02:00


     


 


 


  (Zofran Inj)  4 mg  Q6H  PRN


 IVP  4/17/18 02:00


    4/18/18 18:30


 


 


  (Narcan Inj)  0.4 mg  UNSCH  PRN


 IV PUSH  4/17/18 02:00


     


 


 


  (Breanna-Colace)  1 tab  BID


 PO  4/17/18 09:00


    4/20/18 08:07


 


 


  (Milk Of


 Magnesia Liq)  30 ml  Q12H  PRN


 PO  4/17/18 02:00


    4/20/18 08:08


 


 


  (Senokot)  17.2 mg  Q12H  PRN


 PO  4/17/18 02:00


    4/20/18 08:07


 


 


  (Dulcolax Supp)  10 mg  DAILY  PRN


 RECTAL  4/17/18 02:00


     


 


 


  (Lactulose Liq)  30 ml  DAILY  PRN


 PO  4/17/18 02:00


    4/20/18 08:08


 


 


  (Catapres)  0.1 mg  Q4H  PRN


 PO  4/17/18 09:45


     


 


 


  (Lovenox Inj)  30 mg  Q24H


 SQ  4/18/18 13:00


    4/19/18 11:40


 


 


  (Oscal-D 250-125)  250 mg  TID


 PO  4/17/18 18:00


    4/20/18 08:07


 


 


  (Benadryl)  25 mg  Q6H  PRN


 PO  4/17/18 14:00


     


 


 


  (Vitamin D3)  5,000 units  DAILY


 PO  4/18/18 09:00


    4/20/18 08:07


 


 


  (Flomax)  0.4 mg  DAILY


 PO  4/19/18 09:00


    4/20/18 08:07


 


 


  (Percocet


 7.5-325 Mg)  1 tab  Q4H  PRN


 PO  4/19/18 10:00


    4/20/18 08:07


 


 


 Sodium Chloride  250 ml @ 


 15 mls/hr  ONCE  ONCE


 IV  4/20/18 10:30


 4/21/18 03:09   


 








Urinary Catheter:  No


Vascular Central Line Catheter:  No





A/P


Problem List:  


(1) Closed intertrochanteric fracture of left hip


ICD Code:  S72.142A - Displaced intertrochanteric fracture of left femur, 

initial encounter for closed fracture


Status:  Acute


Assessment and Plan


Left hip fracture


Pelvic x-ray significant for comminuted left intertrochanteric hip fracture 

with medial angulation deformity


   -Cont PO medication to Percocet 7.5mg @4Hr, Morphine for breakthrough pain


   -Orthopedic surgery following, surgery was completed on 4/17


   -PT





Alcohol abuse


   -Thiamine/folate/multivitamins


   -[facility] protocol


   -Monitor for signs of withdrawal





Cocaine abuse, Chronic


   -Cessation counseling provided





Nasal/throat irritation, Resolved


   -Does not appear to be infectious in origin


   -Cont to monitor


   -[place] spray ordered prn





Anemia, hgb 8.0-->6.6, no signs of active bleeding,  likely causing patients sob


   -Transfuse 2 units PRBCs with IV lasix between units


   -Labs in AM


   -Occult stool ordered





BPH


   -Flomax started 4/18


   -Remove [place], straight cath as needed, if patient continues to retain will 

reinsert [place]





Constipation, likely due to pain medication


   -Breanna Colace BID


   -Cont bowel regimen 





DVT prophylaxis: Lovenox


Discharge Planning


Discharge to SNF when set up by case management





Ortho suggests WBAT, Dressing changes, follow up [facility]a in 2 weeks





Problem Qualifiers





(1) Closed intertrochanteric fracture of left hip:  


Qualified Codes:  S72.142A - Displaced intertrochanteric fracture of left femur

, initial encounter for closed fracture








Stephanie Linn Apr 20, 2018 09:49

## 2018-04-21 VITALS
DIASTOLIC BLOOD PRESSURE: 51 MMHG | TEMPERATURE: 98.3 F | OXYGEN SATURATION: 94 % | SYSTOLIC BLOOD PRESSURE: 93 MMHG | HEART RATE: 69 BPM | RESPIRATION RATE: 17 BRPM

## 2018-04-21 VITALS
RESPIRATION RATE: 20 BRPM | OXYGEN SATURATION: 95 % | TEMPERATURE: 98.5 F | SYSTOLIC BLOOD PRESSURE: 96 MMHG | DIASTOLIC BLOOD PRESSURE: 55 MMHG | HEART RATE: 76 BPM

## 2018-04-21 VITALS
HEART RATE: 75 BPM | TEMPERATURE: 98.1 F | DIASTOLIC BLOOD PRESSURE: 53 MMHG | SYSTOLIC BLOOD PRESSURE: 98 MMHG | OXYGEN SATURATION: 97 % | RESPIRATION RATE: 17 BRPM

## 2018-04-21 VITALS
HEART RATE: 74 BPM | OXYGEN SATURATION: 98 % | RESPIRATION RATE: 18 BRPM | DIASTOLIC BLOOD PRESSURE: 59 MMHG | SYSTOLIC BLOOD PRESSURE: 117 MMHG | TEMPERATURE: 98.7 F

## 2018-04-21 VITALS
TEMPERATURE: 98.1 F | OXYGEN SATURATION: 98 % | SYSTOLIC BLOOD PRESSURE: 123 MMHG | HEART RATE: 74 BPM | RESPIRATION RATE: 18 BRPM | DIASTOLIC BLOOD PRESSURE: 58 MMHG

## 2018-04-21 LAB
BASOPHILS # BLD AUTO: 0 TH/MM3 (ref 0–0.2)
BASOPHILS NFR BLD: 0.4 % (ref 0–2)
BUN SERPL-MCNC: 5 MG/DL (ref 7–18)
CALCIUM SERPL-MCNC: 8.8 MG/DL (ref 8.5–10.1)
CHLORIDE SERPL-SCNC: 100 MEQ/L (ref 98–107)
CREAT SERPL-MCNC: 0.8 MG/DL (ref 0.6–1.3)
EOSINOPHIL # BLD: 0.2 TH/MM3 (ref 0–0.4)
EOSINOPHIL NFR BLD: 2.9 % (ref 0–4)
ERYTHROCYTE [DISTWIDTH] IN BLOOD BY AUTOMATED COUNT: 16.4 % (ref 11.6–17.2)
GFR SERPLBLD BASED ON 1.73 SQ M-ARVRAT: 115 ML/MIN (ref 89–?)
GLUCOSE SERPL-MCNC: 83 MG/DL (ref 74–106)
HCO3 BLD-SCNC: 33.2 MEQ/L (ref 21–32)
HCT VFR BLD CALC: 29.2 % (ref 39–51)
HGB BLD-MCNC: 10.1 GM/DL (ref 13–17)
LYMPHOCYTES # BLD AUTO: 1.4 TH/MM3 (ref 1–4.8)
LYMPHOCYTES NFR BLD AUTO: 21.3 % (ref 9–44)
MCH RBC QN AUTO: 31.9 PG (ref 27–34)
MCHC RBC AUTO-ENTMCNC: 34.5 % (ref 32–36)
MCV RBC AUTO: 92.4 FL (ref 80–100)
MONOCYTE #: 0.4 TH/MM3 (ref 0–0.9)
MONOCYTES NFR BLD: 7 % (ref 0–8)
NEUTROPHILS # BLD AUTO: 4.3 TH/MM3 (ref 1.8–7.7)
NEUTROPHILS NFR BLD AUTO: 68.4 % (ref 16–70)
PLATELET # BLD: 206 TH/MM3 (ref 150–450)
PMV BLD AUTO: 7.9 FL (ref 7–11)
RBC # BLD AUTO: 3.16 MIL/MM3 (ref 4.5–5.9)
SODIUM SERPL-SCNC: 138 MEQ/L (ref 136–145)
WBC # BLD AUTO: 6.3 TH/MM3 (ref 4–11)

## 2018-04-21 RX ADMIN — CHOLECALCIFEROL CAP 125 MCG (5000 UNIT) SCH UNITS: 125 CAP at 09:32

## 2018-04-21 RX ADMIN — WATER PRN ML: 1 IRRIGANT IRRIGATION at 20:57

## 2018-04-21 RX ADMIN — MAGNESIUM HYDROXIDE PRN ML: 400 SUSPENSION ORAL at 20:57

## 2018-04-21 RX ADMIN — OXYCODONE HYDROCHLORIDE AND ACETAMINOPHEN PRN TAB: 7.5; 325 TABLET ORAL at 09:31

## 2018-04-21 RX ADMIN — Medication SCH ML: at 20:57

## 2018-04-21 RX ADMIN — CALCIUM CARBONATE-CHOLECALCIFEROL TAB 250 MG-125 UNIT SCH MG: 250-125 TAB at 09:32

## 2018-04-21 RX ADMIN — OXYCODONE HYDROCHLORIDE AND ACETAMINOPHEN PRN TAB: 7.5; 325 TABLET ORAL at 01:22

## 2018-04-21 RX ADMIN — OXYCODONE HYDROCHLORIDE AND ACETAMINOPHEN PRN TAB: 7.5; 325 TABLET ORAL at 21:48

## 2018-04-21 RX ADMIN — Medication SCH MG: at 09:32

## 2018-04-21 RX ADMIN — PHENYTOIN SODIUM SCH MLS/HR: 50 INJECTION INTRAMUSCULAR; INTRAVENOUS at 06:06

## 2018-04-21 RX ADMIN — STANDARDIZED SENNA CONCENTRATE AND DOCUSATE SODIUM SCH TAB: 8.6; 5 TABLET, FILM COATED ORAL at 20:57

## 2018-04-21 RX ADMIN — MULTIPLE VITAMINS W/ MINERALS TAB SCH TAB: TAB at 09:32

## 2018-04-21 RX ADMIN — SENNOSIDES PRN MG: 8.6 TABLET, FILM COATED ORAL at 20:57

## 2018-04-21 RX ADMIN — FOLIC ACID SCH MG: 1 TABLET ORAL at 09:32

## 2018-04-21 RX ADMIN — STANDARDIZED SENNA CONCENTRATE AND DOCUSATE SODIUM SCH TAB: 8.6; 5 TABLET, FILM COATED ORAL at 09:32

## 2018-04-21 RX ADMIN — OXYCODONE HYDROCHLORIDE AND ACETAMINOPHEN PRN TAB: 7.5; 325 TABLET ORAL at 17:34

## 2018-04-21 RX ADMIN — ENOXAPARIN SODIUM SCH MG: 30 INJECTION SUBCUTANEOUS at 13:24

## 2018-04-21 RX ADMIN — TAMSULOSIN HYDROCHLORIDE SCH MG: 0.4 CAPSULE ORAL at 09:35

## 2018-04-21 RX ADMIN — OXYCODONE HYDROCHLORIDE AND ACETAMINOPHEN PRN TAB: 7.5; 325 TABLET ORAL at 13:24

## 2018-04-21 RX ADMIN — PHENYTOIN SODIUM SCH MLS/HR: 50 INJECTION INTRAMUSCULAR; INTRAVENOUS at 20:24

## 2018-04-21 RX ADMIN — OXYCODONE HYDROCHLORIDE AND ACETAMINOPHEN PRN TAB: 7.5; 325 TABLET ORAL at 05:25

## 2018-04-21 RX ADMIN — CALCIUM CARBONATE-CHOLECALCIFEROL TAB 250 MG-125 UNIT SCH MG: 250-125 TAB at 13:24

## 2018-04-21 RX ADMIN — Medication SCH ML: at 09:33

## 2018-04-21 RX ADMIN — CALCIUM CARBONATE-CHOLECALCIFEROL TAB 250 MG-125 UNIT SCH MG: 250-125 TAB at 17:34
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
## 2018-04-22 VITALS
SYSTOLIC BLOOD PRESSURE: 100 MMHG | RESPIRATION RATE: 18 BRPM | HEART RATE: 70 BPM | DIASTOLIC BLOOD PRESSURE: 57 MMHG | OXYGEN SATURATION: 99 % | TEMPERATURE: 99 F

## 2018-04-22 VITALS
HEART RATE: 71 BPM | RESPIRATION RATE: 19 BRPM | TEMPERATURE: 97.5 F | DIASTOLIC BLOOD PRESSURE: 57 MMHG | OXYGEN SATURATION: 100 % | SYSTOLIC BLOOD PRESSURE: 110 MMHG

## 2018-04-22 VITALS
RESPIRATION RATE: 17 BRPM | HEART RATE: 67 BPM | SYSTOLIC BLOOD PRESSURE: 104 MMHG | TEMPERATURE: 98.8 F | OXYGEN SATURATION: 97 % | DIASTOLIC BLOOD PRESSURE: 55 MMHG

## 2018-04-22 VITALS
RESPIRATION RATE: 18 BRPM | OXYGEN SATURATION: 98 % | HEART RATE: 68 BPM | DIASTOLIC BLOOD PRESSURE: 60 MMHG | SYSTOLIC BLOOD PRESSURE: 99 MMHG | TEMPERATURE: 96.7 F

## 2018-04-22 VITALS
OXYGEN SATURATION: 97 % | SYSTOLIC BLOOD PRESSURE: 122 MMHG | HEART RATE: 74 BPM | DIASTOLIC BLOOD PRESSURE: 70 MMHG | RESPIRATION RATE: 18 BRPM | TEMPERATURE: 97.5 F

## 2018-04-22 VITALS
HEART RATE: 77 BPM | DIASTOLIC BLOOD PRESSURE: 56 MMHG | SYSTOLIC BLOOD PRESSURE: 105 MMHG | OXYGEN SATURATION: 98 % | TEMPERATURE: 99.6 F | RESPIRATION RATE: 17 BRPM

## 2018-04-22 RX ADMIN — STANDARDIZED SENNA CONCENTRATE AND DOCUSATE SODIUM SCH TAB: 8.6; 5 TABLET, FILM COATED ORAL at 08:48

## 2018-04-22 RX ADMIN — OXYCODONE HYDROCHLORIDE AND ACETAMINOPHEN PRN TAB: 7.5; 325 TABLET ORAL at 08:48

## 2018-04-22 RX ADMIN — PHENYTOIN SODIUM SCH MLS/HR: 50 INJECTION INTRAMUSCULAR; INTRAVENOUS at 10:42

## 2018-04-22 RX ADMIN — STANDARDIZED SENNA CONCENTRATE AND DOCUSATE SODIUM SCH TAB: 8.6; 5 TABLET, FILM COATED ORAL at 20:52

## 2018-04-22 RX ADMIN — CALCIUM CARBONATE-CHOLECALCIFEROL TAB 250 MG-125 UNIT SCH MG: 250-125 TAB at 12:26

## 2018-04-22 RX ADMIN — Medication SCH ML: at 20:52

## 2018-04-22 RX ADMIN — CALCIUM CARBONATE-CHOLECALCIFEROL TAB 250 MG-125 UNIT SCH MG: 250-125 TAB at 16:53

## 2018-04-22 RX ADMIN — OXYCODONE HYDROCHLORIDE AND ACETAMINOPHEN PRN TAB: 7.5; 325 TABLET ORAL at 16:53

## 2018-04-22 RX ADMIN — TAMSULOSIN HYDROCHLORIDE SCH MG: 0.4 CAPSULE ORAL at 08:48

## 2018-04-22 RX ADMIN — CHOLECALCIFEROL CAP 125 MCG (5000 UNIT) SCH UNITS: 125 CAP at 08:48

## 2018-04-22 RX ADMIN — Medication SCH ML: at 08:49

## 2018-04-22 RX ADMIN — Medication SCH MG: at 08:48

## 2018-04-22 RX ADMIN — OXYCODONE HYDROCHLORIDE AND ACETAMINOPHEN PRN TAB: 7.5; 325 TABLET ORAL at 12:26

## 2018-04-22 RX ADMIN — OXYCODONE HYDROCHLORIDE AND ACETAMINOPHEN PRN TAB: 7.5; 325 TABLET ORAL at 20:52

## 2018-04-22 RX ADMIN — ENOXAPARIN SODIUM SCH MG: 30 INJECTION SUBCUTANEOUS at 12:26

## 2018-04-22 RX ADMIN — CALCIUM CARBONATE-CHOLECALCIFEROL TAB 250 MG-125 UNIT SCH MG: 250-125 TAB at 08:48

## 2018-04-22 RX ADMIN — OXYCODONE HYDROCHLORIDE AND ACETAMINOPHEN PRN TAB: 7.5; 325 TABLET ORAL at 04:50

## 2018-04-22 NOTE — HHI.PR
Subjective


Remarks


Patient says he is trying to sleep.  No complaints.  Pain control





Objective


Vitals





Vital Signs








  Date Time  Temp Pulse Resp B/P (MAP) Pulse Ox O2 Delivery O2 Flow Rate FiO2


 


4/22/18 08:00 98.8 67 17 104/55 (71) 97   


 


4/22/18 00:00 99.6 77 17 105/56 (72) 98   


 


4/21/18 20:00 98.7 74 18 117/59 (78) 98   


 


4/21/18 16:00 98.1 74 18 123/58 (79) 98   


 


4/21/18 14:06      Nasal Cannula 1.00 


 


4/21/18 12:43 98.1 75 17 98/53 (68) 97   














I/O      


 


 4/21/18 4/21/18 4/21/18 4/22/18 4/22/18 4/22/18





 07:00 15:00 23:00 07:00 15:00 23:00


 


Intake Total 1380 ml  1660 ml   


 


Output Total 3300 ml  2875 ml 1550 ml  


 


Balance -1920 ml  -1215 ml -1550 ml  


 


      


 


Intake Oral 720 ml  1660 ml   


 


Packed Cells 400 ml     


 


Blood Product IV Normal Saline Flush 260 ml     


 


Output Urine Total 3300 ml  2875 ml 1550 ml  


 


# Bowel Movements 0  0 1  








Result Diagram:  


4/21/18 0546                                                                   

             4/21/18 0546





Imaging





Last Impressions








Hip and Pelvis X-Ray 4/17/18 0031 Signed





Impressions: 





 Service Date/Time:  Tuesday, April 17, 2018 00:54 - CONCLUSION:  Comminuted 

left 





 intertrochanteric hip fracture with medial angulation deformity.     Rohan Bartholomew MD 


 


Femur X-Ray 4/17/18 0031 Signed





Impressions: 





 Service Date/Time:  Tuesday, April 17, 2018 00:57 - CONCLUSION:  Acute 





 intertrochanteric fracture.     Rohan Bartholomew MD 


 


Chest X-Ray 4/17/18 0031 Signed





Impressions: 





 Service Date/Time:  Tuesday, April 17, 2018 00:58 - CONCLUSION:  No acute 





 abnormality demonstrated.     Rohan Bartholomew MD 








Objective Remarks


 GENERAL: Pulling covers over himself.


CARDIOVASCULAR: Regular rate and rhythm.  


RESPIRATORY: No accessory muscle use. Clear to auscultation. Breath sounds 

equal bilaterally. 


GASTROINTESTINAL: Abdomen soft, non-tender, nondistended. Hepatic and splenic 

margins not palpable. 


MUSCULOSKELETAL: Extremities without edema. dressing over left hip d/c/i


NEUROLOGICAL: Awake and alert.


Procedures


Date of Surgery:  Apr 17, 2018


Preoperative Diagnosis:  


Displaced left hip intertrochanteric fracture


Postoperative Diagnosis:  


Procedure:


Left hip reduction and intramedullary fixation


Anesthesia:


General


Surgeon:


Get Espinosa


Assistant(s):


RIVAS Noe PA-C





 


The surgical procedure was assisted by my physician assistant. My P.A. presence 

was necessary throughout this case for the manipulation and positioning of the 

surgical extremity. My P.A. was assisting me throughout the duration of this 

procedure. The skill set of a physician assistant was medically necessary to 

complete this procedure. During the surgical case the surgical tech was working 

at the back table and the physician assistant was directly assisting me.


Operation and Findings:


Implants used: [12]mm x [420]mm   Synthes  troch nail





Plan of activity: Weight-bear as tolerated





Patient was seen and evaluated preoperatively.  The patient has significant hip 

pain from proximal femur fracture.  The risk and benefits of surgery were 

discussed in depth with the patient to include bleeding, infection, nonunion, 

malunion,  need for hip replacement, painful hardware, as well as medical 

competitions including blood clots, stroke, heart attack, and death.  Informed 

consent was obtained.  Operative site was marked.  Patient was brought to the 

operating room and placed on fracture table.  IV sedation was administered by 

anesthesiologist.  Timeout procedure was performed.  Hip and leg were prepped 

with alcohol followed by DuraPrep and draped in the usual sterile fashion.  IV 

antibiotics were given prior to incision.





Procedure began with reduction of fracture.  Traction was applied.  The leg was 

manipulated to achieve reduction.  Excellent reduction was achieved.  

Fluoroscopy was used to confirm reduction.  A three inch incision was made 

proximal to the trochanter.  Subcutaneous tissue was dissected bluntly.  

Guidepin was placed at the tip of the trochanter and advanced into the femoral 

canal.  Fluoroscopy confirmed appropriate guidepin placement.  A opening reamer 

was placed over the guidepin.  A long ball tipped guide pin was now placed down 

the femoral canal into the center of the distal femur.  The nail length was now 

measured.  Fluoroscopy confirmed appropriate guidepin placement.  Flexible 

reamers were now passed over the guidepin to ream the intramedullary canal.





The  nail was attached to the insertion handle.  Nail was now placed over the 

guidepin into the femoral canal.  Fluoroscopy confirmed appropriate nail 

placement.  A second incision was made over the lateral thigh.  Cannulas were 

placed through the insertion handle down to the femur.  Guidepin was now placed 

through the femoral nail into the center of the femoral head.  Fluoroscopy 

confirmed appropriate guidepin placement.  Screw length was measured.  

Cannulated drill was placed over the guidepin.  Appropriate length lag screw 

was now placed.  Traction was released and compression was applied. The set 

screw was now tightened in dynamic mode.





Next, using perfect [place] technique two distal interlocking screws were 

placed.  Screw holes were predrilled and screw lengths were measured.  Final 

fluoroscopy revealed well aligned fracture with well-placed hardware.  Incision 

was closed with 3-0 Vicryl and staples.  Sterile dressings were applied.  

Patient was awakened and transferred to recovery room.











Get Espinosa MD Apr 17, 2018 13:49





A/P


Problem List:  


(1) Closed intertrochanteric fracture of left hip


ICD Code:  S72.142A - Displaced intertrochanteric fracture of left femur, 

initial encounter for closed fracture


Status:  Acute


Assessment and Plan


Pt admitted for Left hip fracture: Pelvic x-ray significant for comminuted left 

intertrochanteric hip fracture with medial angulation deformity. s/p Left 

intertrochanteric femur fracture s/p IMN - POD 5 -WBAT -daily dressing changes -

scripts for pain meds and xarelto in chart. 





Alcohol abuse- s/p Thiamine/folate/multivitamins- was on [facility] protocol. Pt has 

been counseled





Cocaine abuse, Chronic-Cessation counseling provided





Nasal/throat irritation, Resolved. Encouraged pt to f/u w PCP as an outpatient 





Anemia, post op, hb had dropped to 6.6 and received 2 units of PRBC. Hb now 

stable at 10.1


Discharge Planning


Discharge orders are in place.  CM assisting with discharge planning.





Problem Qualifiers





(1) Closed intertrochanteric fracture of left hip:  


Qualified Codes:  S72.142A - Displaced intertrochanteric fracture of left femur

, initial encounter for closed fracture








Kelli Chaidez MD Apr 22, 2018 11:09

## 2018-04-23 VITALS
RESPIRATION RATE: 17 BRPM | SYSTOLIC BLOOD PRESSURE: 91 MMHG | OXYGEN SATURATION: 97 % | DIASTOLIC BLOOD PRESSURE: 55 MMHG | HEART RATE: 67 BPM | TEMPERATURE: 98.1 F

## 2018-04-23 RX ADMIN — CALCIUM CARBONATE-CHOLECALCIFEROL TAB 250 MG-125 UNIT SCH MG: 250-125 TAB at 09:21

## 2018-04-23 RX ADMIN — TAMSULOSIN HYDROCHLORIDE SCH MG: 0.4 CAPSULE ORAL at 09:21

## 2018-04-23 RX ADMIN — Medication SCH ML: at 09:00

## 2018-04-23 RX ADMIN — OXYCODONE HYDROCHLORIDE AND ACETAMINOPHEN PRN TAB: 7.5; 325 TABLET ORAL at 01:58

## 2018-04-23 RX ADMIN — STANDARDIZED SENNA CONCENTRATE AND DOCUSATE SODIUM SCH TAB: 8.6; 5 TABLET, FILM COATED ORAL at 09:21

## 2018-04-23 RX ADMIN — OXYCODONE HYDROCHLORIDE AND ACETAMINOPHEN PRN TAB: 7.5; 325 TABLET ORAL at 09:22

## 2018-04-23 RX ADMIN — PHENYTOIN SODIUM SCH MLS/HR: 50 INJECTION INTRAMUSCULAR; INTRAVENOUS at 01:00

## 2018-04-23 RX ADMIN — Medication SCH MG: at 09:21

## 2018-04-23 RX ADMIN — CHOLECALCIFEROL CAP 125 MCG (5000 UNIT) SCH UNITS: 125 CAP at 09:21

## 2018-04-23 NOTE — HHI.PR
Subjective


Remarks


Patient reports he is feeling okay.  Pain is controlled.  Anticipating 

discharge to SNF today.





Objective


Vitals





Vital Signs








  Date Time  Temp Pulse Resp B/P (MAP) Pulse Ox O2 Delivery O2 Flow Rate FiO2


 


4/23/18 07:44 98.1 67 17 91/55 (67) 97   


 


4/22/18 23:00 99.0 70 18 100/57 (71) 99   


 


4/22/18 20:05 96.7 68 18 99/60 (73) 98   


 


4/22/18 16:27 97.5 71 19 110/57 (74) 100   


 


4/22/18 12:23 97.5 74 18 122/70 (87) 97   














I/O      


 


 4/22/18 4/22/18 4/22/18 4/23/18 4/23/18 4/23/18





 07:00 15:00 23:00 07:00 15:00 23:00


 


Intake Total   1350 ml   


 


Output Total 1550 ml  1450 ml 1175 ml  


 


Balance -1550 ml  -100 ml -1175 ml  


 


      


 


Intake Oral   1350 ml   


 


Output Urine Total 1550 ml  1450 ml 1175 ml  


 


# Bowel Movements 1  0   








Result Diagram:  


4/21/18 0546                                                                   

             4/21/18 0546





Objective Remarks


GENERAL: Pulling covers over himself.


CARDIOVASCULAR: Regular rate and rhythm.  


RESPIRATORY: No accessory muscle use. Clear to auscultation. Breath sounds 

equal bilaterally. 


GASTROINTESTINAL: Abdomen soft, non-tender, nondistended. Hepatic and splenic 

margins not palpable. 


MUSCULOSKELETAL: Extremities without edema. dressing over left hip d/c/i


NEUROLOGICAL: Awake and alert.


Procedures


Left hip reduction and intramedullary fixation





A/P


Problem List:  


(1) Closed intertrochanteric fracture of left hip


ICD Code:  S72.142A - Displaced intertrochanteric fracture of left femur, 

initial encounter for closed fracture


Status:  Acute


Assessment and Plan


Pt admitted for Left hip fracture: Pelvic x-ray significant for comminuted left 

intertrochanteric hip fracture with medial angulation deformity. s/p Left 

intertrochanteric femur fracture s/p IMN --WBAT -daily dressing changes -

scripts for pain meds and xarelto in chart. 





Alcohol abuse- s/p Thiamine/folate/multivitamins- Out of withdrawal window. Pt 

has been counseled





Cocaine abuse, Chronic-Cessation counseling provided





Nasal/throat irritation, Resolved. Encouraged pt to f/u w PCP as an outpatient 





Anemia, post op, hb had dropped to 6.6 and received 2 units of PRBC. Hb now 

stable


Discharge Planning


DCed to SNF.  Resume all previous discharge orders.





Problem Qualifiers





(1) Closed intertrochanteric fracture of left hip:  


Qualified Codes:  S72.142A - Displaced intertrochanteric fracture of left femur

, initial encounter for closed fracture








Guy Nieto MD Apr 23, 2018 10:31